# Patient Record
Sex: FEMALE | Race: WHITE | NOT HISPANIC OR LATINO | Employment: UNEMPLOYED | URBAN - METROPOLITAN AREA
[De-identification: names, ages, dates, MRNs, and addresses within clinical notes are randomized per-mention and may not be internally consistent; named-entity substitution may affect disease eponyms.]

---

## 2021-03-15 ENCOUNTER — TRANSFERRED RECORDS (OUTPATIENT)
Dept: MULTI SPECIALTY CLINIC | Facility: CLINIC | Age: 31
End: 2021-03-15

## 2021-03-15 LAB
HPV ABSTRACT: NORMAL
PAP-ABSTRACT: NORMAL

## 2022-08-02 ENCOUNTER — OFFICE VISIT (OUTPATIENT)
Dept: OBGYN | Facility: OTHER | Age: 32
End: 2022-08-02
Attending: STUDENT IN AN ORGANIZED HEALTH CARE EDUCATION/TRAINING PROGRAM
Payer: COMMERCIAL

## 2022-08-02 VITALS — SYSTOLIC BLOOD PRESSURE: 102 MMHG | HEART RATE: 98 BPM | WEIGHT: 126 LBS | DIASTOLIC BLOOD PRESSURE: 68 MMHG

## 2022-08-02 DIAGNOSIS — Z80.3 FAMILY HISTORY OF MALIGNANT NEOPLASM OF BREAST: ICD-10-CM

## 2022-08-02 DIAGNOSIS — N92.0 INTERMENSTRUAL SPOTTING: Primary | ICD-10-CM

## 2022-08-02 LAB
C TRACH DNA SPEC QL PROBE+SIG AMP: NEGATIVE
CLUE CELLS: ABNORMAL
HCG UR QL: NEGATIVE
N GONORRHOEA DNA SPEC QL NAA+PROBE: NEGATIVE
TRICHOMONAS, WET PREP: ABNORMAL
WBC'S/HIGH POWER FIELD, WET PREP: ABNORMAL
YEAST, WET PREP: ABNORMAL

## 2022-08-02 PROCEDURE — 99203 OFFICE O/P NEW LOW 30 MIN: CPT | Mod: 25 | Performed by: STUDENT IN AN ORGANIZED HEALTH CARE EDUCATION/TRAINING PROGRAM

## 2022-08-02 PROCEDURE — 87591 N.GONORRHOEAE DNA AMP PROB: CPT | Mod: ZL | Performed by: STUDENT IN AN ORGANIZED HEALTH CARE EDUCATION/TRAINING PROGRAM

## 2022-08-02 PROCEDURE — 87210 SMEAR WET MOUNT SALINE/INK: CPT | Mod: ZL | Performed by: STUDENT IN AN ORGANIZED HEALTH CARE EDUCATION/TRAINING PROGRAM

## 2022-08-02 PROCEDURE — 81025 URINE PREGNANCY TEST: CPT | Mod: ZL | Performed by: STUDENT IN AN ORGANIZED HEALTH CARE EDUCATION/TRAINING PROGRAM

## 2022-08-02 PROCEDURE — 87491 CHLMYD TRACH DNA AMP PROBE: CPT | Mod: ZL | Performed by: STUDENT IN AN ORGANIZED HEALTH CARE EDUCATION/TRAINING PROGRAM

## 2022-08-02 PROCEDURE — 88305 TISSUE EXAM BY PATHOLOGIST: CPT

## 2022-08-02 PROCEDURE — 58100 BIOPSY OF UTERUS LINING: CPT | Performed by: STUDENT IN AN ORGANIZED HEALTH CARE EDUCATION/TRAINING PROGRAM

## 2022-08-02 RX ORDER — VENLAFAXINE HYDROCHLORIDE 75 MG/1
75 CAPSULE, EXTENDED RELEASE ORAL
COMMUNITY
Start: 2022-06-14

## 2022-08-02 RX ORDER — LORAZEPAM 0.5 MG/1
0.5 TABLET ORAL
COMMUNITY
Start: 2021-07-23

## 2022-08-02 NOTE — PROGRESS NOTES
[facility] pGynecology Office Visit    Chief Complaint: intermenstrual spotting    HPI:    Alexa Pimentel is a 31 year old G0 here for discussion of intermenstrual spotting. She gets menses about every 5 weeks. The last 3-5 days. On her heaviest days she goes through 3-4 pads/tampons. She notes 1-2 days of intermenstrual spotting that happen around the middle of each cycle. It is lighter, but does not necessarily correspond with intercourse.      She denies any pain with intercourse. She denies any pain associated with periods. Denies any fevers or chills. She is currently using condoms for contraception. She denies any plans for future childbearing, but is not interested in discussion of permanent sterilization. She has used a Mirena IUD in the past.     OBHx  OB History    Para Term  AB Living   0 0 0 0 0 0   SAB IAB Ectopic Multiple Live Births   0 0 0 0 0   G0    GYN history:   No history of STIs  Last pap smear: 2021 NIL, HPV negative. No history of abnormal pap smears  Menses occur q 5 weeks and usually last 3-4 days    She has previously had a hysteroscopic polypectomy performed in 2021.    Past medical history:  Past Medical History:   Diagnosis Date     Jelly-Danlos syndrome      RAO (generalized anxiety disorder)      Denies any vascular Jelly-Danlos  She was diagnosed about 2 years ago.     Specifically denies VTE, DM, HTN or bleeding disorders    Past Surgical History:  Past Surgical History:   Procedure Laterality Date     HYSTEROSCOPY W/ POLYPECTOMY       MANDIBLE SURGERY       MOLE REMOVAL      on back, removed when she 17 years old     wisdom teeth         Medications:  Current Outpatient Medications   Medication     LORazepam (ATIVAN) 0.5 MG tablet     venlafaxine (EFFEXOR XR) 75 MG 24 hr capsule     No current facility-administered medications for this visit.   Rare ativan use to help sleep    Allergies:       Allergies   Allergen Reactions     Sulfa Drugs Other (See  Comments)     vomiting       Social History:  Social History     Tobacco Use     Smoking status: Never Smoker     Smokeless tobacco: Never Used   Vaping Use     Vaping Use: Never used   Substance Use Topics     Alcohol use: Yes     Comment: rare/ social     Drug use: Yes     Types: Marijuana     Comment: edibles      Rare edibles  No alcohol use    Family History:  Family History   Problem Relation Age of Onset     Breast Cancer Mother      Jelly-Danlos syndrome Father      Jelly-Danlos syndrome Paternal Grandmother      Jelly-Danlos syndrome Other      Specifically denies breast, ovarian, colon, pancreatic cancers  Specifically denies VTE, known familial thrombophilias and coagulopathies    ROS:   Respiratory: No shortness of breath, dyspnea on exertion, cough, or hemoptysis  Cardiovascular: negative for palpitations, chest pain, lower extremity edema and syncope or near-syncope  Gastrointestinal: negative for, nausea, vomiting and hematemesis  Genitourinary: negative for, dysuria, frequency and urgency  Musculoskeletal: negative for, back pain and muscular weakness  Psychiatric: negative for, anxiety, depression and hallucinations  Hematologic/Lymphatic/Immunologic: negative for, anemia, chills and fever    Physical Exam  /68   Pulse 98   Wt 57.2 kg (126 lb)   LMP 07/04/2022   Breastfeeding No   Gen: Well-appearing, no acute distressed, well-groomed, alert  HEENT: Normocephalic, atraumatic  Cardiovascular: Regular rate, No peripheral edema, normal peripheral circulation  Pulm: Symmetric chest rise, non-labored respirations  Abd: Soft, non-tender, non-distended  Ext: No LE edema, extremities warm and well perfused  Pelvic:  Normal appearing external female genitalia. Normal hair distribution. Vagina is without lesions with moist, pink ruggae. There is no vaginal discharge. Cervix nulliparous, no lesions, no cervical motion tenderness. Uterus is approximately 6 cm, mobile, non-tender. No adnexal  tenderness or masses      Assessment/Plan  Alexa Pimentel is a 31 year old G0 female here for  Intermenstrual spotting.    # Intermenstrual spotting  -- EMB today  -- SIS scheduled  -- Normal pelvic US already performed at [facility] and reviewed  -- discussed if no polyp is present, can try to re-insert Mirena IUD to help with increasing chances for amenorrhea    # Family history of breast cancer  -- Has already had BRCA testing done and was negative  -- Mother was diagnosed in her 40s, discussed starting screening now as she is 10 years earlier than her mother's diagnosis   She notes that her primary provider has already addressed this and she has a high-risk mammogram scheduled next week in [place]. She has been getting these annually    Total amount of time spent during today's encounter including chart prep, face to face, documentation was 40 minutes. 5 minutes in addition were spent with the procedure.    CHECO CATALAN MD on 8/2/2022 at 10:07 AM

## 2022-08-04 LAB
PATH REPORT.COMMENTS IMP SPEC: NORMAL
PATH REPORT.FINAL DX SPEC: NORMAL
PHOTO IMAGE: NORMAL